# Patient Record
Sex: MALE | Race: WHITE | Employment: OTHER | URBAN - METROPOLITAN AREA
[De-identification: names, ages, dates, MRNs, and addresses within clinical notes are randomized per-mention and may not be internally consistent; named-entity substitution may affect disease eponyms.]

---

## 2024-11-22 ENCOUNTER — APPOINTMENT (OUTPATIENT)
Dept: GENERAL RADIOLOGY | Age: 59
End: 2024-11-22
Payer: COMMERCIAL

## 2024-11-22 ENCOUNTER — APPOINTMENT (OUTPATIENT)
Dept: CT IMAGING | Age: 59
End: 2024-11-22
Payer: COMMERCIAL

## 2024-11-22 ENCOUNTER — HOSPITAL ENCOUNTER (EMERGENCY)
Age: 59
Discharge: NURSING, REHAB OR LONG-TERM CARE | End: 2024-11-22
Attending: EMERGENCY MEDICINE
Payer: COMMERCIAL

## 2024-11-22 VITALS
HEART RATE: 98 BPM | TEMPERATURE: 98.2 F | DIASTOLIC BLOOD PRESSURE: 88 MMHG | OXYGEN SATURATION: 100 % | RESPIRATION RATE: 16 BRPM | SYSTOLIC BLOOD PRESSURE: 134 MMHG

## 2024-11-22 DIAGNOSIS — Z86.73 HISTORY OF STROKE: ICD-10-CM

## 2024-11-22 DIAGNOSIS — R41.82 ALTERED MENTAL STATUS, UNSPECIFIED ALTERED MENTAL STATUS TYPE: Primary | ICD-10-CM

## 2024-11-22 LAB
ALBUMIN SERPL-MCNC: 4.7 G/DL (ref 3.4–5)
ALBUMIN/GLOB SERPL: 1.3 {RATIO} (ref 1.1–2.2)
ALP SERPL-CCNC: 118 U/L (ref 40–129)
ALT SERPL-CCNC: 26 U/L (ref 10–40)
ANION GAP SERPL CALCULATED.3IONS-SCNC: 16 MMOL/L (ref 4–16)
AST SERPL-CCNC: 25 U/L (ref 15–37)
BASOPHILS # BLD: 0.04 K/UL
BASOPHILS NFR BLD: 1 % (ref 0–1)
BILIRUB SERPL-MCNC: 0.2 MG/DL (ref 0–1)
BNP SERPL-MCNC: <36 PG/ML (ref 0–125)
BUN SERPL-MCNC: 9 MG/DL (ref 6–23)
CALCIUM SERPL-MCNC: 9.9 MG/DL (ref 8.3–10.6)
CHLORIDE SERPL-SCNC: 97 MMOL/L (ref 99–110)
CK SERPL-CCNC: 105 U/L (ref 38–174)
CO2 SERPL-SCNC: 25 MMOL/L (ref 21–32)
CREAT SERPL-MCNC: 1.6 MG/DL (ref 0.9–1.3)
EKG ATRIAL RATE: 500 BPM
EKG DIAGNOSIS: NORMAL
EKG Q-T INTERVAL: 366 MS
EKG QRS DURATION: 62 MS
EKG QTC CALCULATION (BAZETT): 450 MS
EKG R AXIS: 18 DEGREES
EKG T AXIS: 27 DEGREES
EKG VENTRICULAR RATE: 91 BPM
EOSINOPHIL # BLD: 0.22 K/UL
EOSINOPHILS RELATIVE PERCENT: 3 % (ref 0–3)
ERYTHROCYTE [DISTWIDTH] IN BLOOD BY AUTOMATED COUNT: 16 % (ref 11.7–14.9)
GFR, ESTIMATED: 49 ML/MIN/1.73M2
GLUCOSE SERPL-MCNC: 218 MG/DL (ref 70–99)
HCT VFR BLD AUTO: 31 % (ref 42–52)
HGB BLD-MCNC: 9.9 G/DL (ref 13.5–18)
IMM GRANULOCYTES # BLD AUTO: 0.06 K/UL
IMM GRANULOCYTES NFR BLD: 1 %
LACTATE BLDV-SCNC: 2.5 MMOL/L (ref 0.4–2)
LYMPHOCYTES NFR BLD: 1.41 K/UL
LYMPHOCYTES RELATIVE PERCENT: 17 % (ref 24–44)
MCH RBC QN AUTO: 30.9 PG (ref 27–31)
MCHC RBC AUTO-ENTMCNC: 31.9 G/DL (ref 32–36)
MCV RBC AUTO: 96.9 FL (ref 78–100)
MONOCYTES NFR BLD: 0.43 K/UL
MONOCYTES NFR BLD: 5 % (ref 0–4)
NEUTROPHILS NFR BLD: 75 % (ref 36–66)
NEUTS SEG NFR BLD: 6.35 K/UL
PLATELET # BLD AUTO: 276 K/UL (ref 140–440)
PMV BLD AUTO: 10.1 FL (ref 7.5–11.1)
POTASSIUM SERPL-SCNC: 3.9 MMOL/L (ref 3.5–5.1)
PROT SERPL-MCNC: 8.2 G/DL (ref 6.4–8.2)
RBC # BLD AUTO: 3.2 M/UL (ref 4.6–6.2)
SODIUM SERPL-SCNC: 138 MMOL/L (ref 135–145)
TROPONIN I SERPL HS-MCNC: 9 NG/L (ref 0–21)
WBC OTHER # BLD: 8.5 K/UL (ref 4–10.5)

## 2024-11-22 PROCEDURE — 72125 CT NECK SPINE W/O DYE: CPT

## 2024-11-22 PROCEDURE — 83605 ASSAY OF LACTIC ACID: CPT

## 2024-11-22 PROCEDURE — 82550 ASSAY OF CK (CPK): CPT

## 2024-11-22 PROCEDURE — 72170 X-RAY EXAM OF PELVIS: CPT

## 2024-11-22 PROCEDURE — 85025 COMPLETE CBC W/AUTO DIFF WBC: CPT

## 2024-11-22 PROCEDURE — 70450 CT HEAD/BRAIN W/O DYE: CPT

## 2024-11-22 PROCEDURE — 84484 ASSAY OF TROPONIN QUANT: CPT

## 2024-11-22 PROCEDURE — 80053 COMPREHEN METABOLIC PANEL: CPT

## 2024-11-22 PROCEDURE — 99285 EMERGENCY DEPT VISIT HI MDM: CPT

## 2024-11-22 PROCEDURE — 84443 ASSAY THYROID STIM HORMONE: CPT

## 2024-11-22 PROCEDURE — 73090 X-RAY EXAM OF FOREARM: CPT

## 2024-11-22 PROCEDURE — 73030 X-RAY EXAM OF SHOULDER: CPT

## 2024-11-22 PROCEDURE — 83880 ASSAY OF NATRIURETIC PEPTIDE: CPT

## 2024-11-22 PROCEDURE — 71045 X-RAY EXAM CHEST 1 VIEW: CPT

## 2024-11-22 ASSESSMENT — ENCOUNTER SYMPTOMS
ALLERGIC/IMMUNOLOGIC NEGATIVE: 1
RESPIRATORY NEGATIVE: 1
EYES NEGATIVE: 1

## 2024-11-22 NOTE — ED NOTES
Pt at [facility] for rehab post stroke, pituitary gland tumor removal , aneurism and brain bleed treated  at [facility] a month ago. Stoke left ride sided weakness, speech issue. Pt has fallen 3 times in this week , negative xrays per [facility]. Pt wife  sts he is improving and the concern she has is the patient inability to realize he has right sided weakness and  trys to walk and falls. Per [facility] neuro surgeon wanted pt transported to an ed to have blood work and CT. Wife at bedside, call light within reach.

## 2024-11-22 NOTE — ED NOTES
Pt back  in room. Warm  blankets provided, call light within reach, wife  at bedside. Updated wife plan of care, pt sleeping. Pt on monitor

## 2024-11-22 NOTE — ED PROVIDER NOTES
shoulder and right knee hurt from the fall and the right forearm.  Will get x-rays of both right shoulder and right forearm with a CT brain C-spine chest x-ray pelvis x-ray given fall recent brain surgery and hemorrhage and stroke.  Will do some basic lab work, EKG was normal.  So far labs fairly unremarkable.  Currently I am at the end of my shift patient awaiting imaging reevaluation, dispo unclear if he needs to go back to [facility] or back to facility.  I will sign patient out to Dr. Peralta will follow-up with labs imaging and disposition.    CLINICAL IMPRESSION:  Final diagnoses:   Altered mental status, unspecified altered mental status type   History of stroke       (Please note that portions of this note may have been completed with a voice recognition program. Efforts were made to edit the dictations but occasionally words aremis-transcribed.)    DISPOSITION REFERRAL (if applicable):  No follow-up provider specified.    DISPOSITION MEDICATIONS (if applicable):  New Prescriptions    No medications on file          DO Stanislaw Clarke James, DO  11/22/24 5485

## 2024-11-23 LAB — TSH SERPL DL<=0.05 MIU/L-ACNC: 0.8 UIU/ML (ref 0.27–4.2)

## 2024-11-23 NOTE — ED PROVIDER NOTES
Addendum:    I took over the care of this patient from Dr. Dover.  We discussed the presentation and physical findings laboratory findings pending CT of the brain and pending disposition.    I reviewed the patient discharge summary from [facility] and the timeline of events that complicated the patient admission, surgery and consultations with other specialties.    Additionally had communicated to me that if the findings on the previous CT at baseline patient supposed to be discharged back to nursing home.    I reviewed the results of the imaging studies and labs and discussed this with the wife who was sitting at the bedside.  She agreed for the patient to be sent back and that she would follow-up with the nursing home/rehab center for further actions.    Patient is stable clinically throughout the ED ED stay as per documentation.    Patient discharged in stable condition.     Ernie Peralta MD  11/22/24 6088

## 2024-11-23 NOTE — ED NOTES
Patient incontinent of urine. Brief changed and tima care performed. Patient tolerated well. Spouse remains at bedside.

## 2024-11-23 NOTE — DISCHARGE INSTR - COC
Continuity of Care Form    Patient Name: Jesus Nguyen   :  1965  MRN:  [id number]    Admit date:  2024  Discharge date:  ***    Code Status Order: No Order   Advance Directives:   Advance Care Flowsheet Documentation             Admitting Physician:  No admitting provider for patient encounter.  PCP: No primary care provider on file.    Discharging Nurse: ***  Discharging Hospital Unit/Room#: ED-06/E06  Discharging Unit Phone Number: ***    Emergency Contact:   Extended Emergency Contact Information  Primary Emergency Contact: Toya Nguyen  Home Phone: [phone]  Mobile Phone: [phone]  Relation: Spouse    Past Surgical History:  No past surgical history on file.    Immunization History:   Immunization History   Administered Date(s) Administered    COVID-19, MODERNA BLUE border, Primary or Immunocompromised, (age 12y+), IM, 100 mcg/0.5mL 03/15/2021, 2021, 2022    COVID-19, PFIZER, (age 12y+), IM, 30mcg/0.3mL 2024       Active Problems:  There is no problem list on file for this patient.      Isolation/Infection:   Isolation            No Isolation          Patient Infection Status       None to display            Nurse Assessment:  Last Vital Signs: /88   Pulse 98   Temp 98.2 °F (36.8 °C) (Oral)   Resp 16   SpO2 100%     Last documented pain score (0-10 scale):    Last Weight:   Wt Readings from Last 1 Encounters:   No data found for Wt     Mental Status:  {IP PT MENTAL STATUS:}    IV Access:  { JOE IV ACCESS:177207846}    Nursing Mobility/ADLs:  Walking   {CHP DME ADLs:871806608}  Transfer  {CHP DME ADLs:606696965}  Bathing  {CHP DME ADLs:008087530}  Dressing  {CHP DME ADLs:388643120}  Toileting  {CHP DME ADLs:351353659}  Feeding  {CHP DME ADLs:070903077}  Med Admin  {CHP DME ADLs:124074417}  Med Delivery   { JOE MED Delivery:819169982}    Wound Care Documentation and Therapy:        Elimination:  Continence:   Bowel: {YES / NO:}  Bladder: {YES /

## 2024-11-25 LAB
EKG ATRIAL RATE: 500 BPM
EKG DIAGNOSIS: NORMAL
EKG Q-T INTERVAL: 366 MS
EKG QRS DURATION: 62 MS
EKG QTC CALCULATION (BAZETT): 450 MS
EKG R AXIS: 18 DEGREES
EKG T AXIS: 27 DEGREES
EKG VENTRICULAR RATE: 91 BPM